# Patient Record
Sex: FEMALE | Race: OTHER
[De-identification: names, ages, dates, MRNs, and addresses within clinical notes are randomized per-mention and may not be internally consistent; named-entity substitution may affect disease eponyms.]

---

## 2017-07-21 PROBLEM — Z00.00 ENCOUNTER FOR PREVENTIVE HEALTH EXAMINATION: Status: ACTIVE | Noted: 2017-07-21

## 2017-08-18 ENCOUNTER — APPOINTMENT (OUTPATIENT)
Dept: OTOLARYNGOLOGY | Facility: CLINIC | Age: 22
End: 2017-08-18

## 2019-09-20 ENCOUNTER — APPOINTMENT (OUTPATIENT)
Dept: OTOLARYNGOLOGY | Facility: CLINIC | Age: 24
End: 2019-09-20
Payer: COMMERCIAL

## 2019-09-20 VITALS
BODY MASS INDEX: 24.59 KG/M2 | HEIGHT: 64 IN | DIASTOLIC BLOOD PRESSURE: 67 MMHG | HEART RATE: 64 BPM | SYSTOLIC BLOOD PRESSURE: 102 MMHG | WEIGHT: 144 LBS

## 2019-09-20 DIAGNOSIS — R49.0 DYSPHONIA: ICD-10-CM

## 2019-09-20 DIAGNOSIS — Z86.19 PERSONAL HISTORY OF OTHER INFECTIOUS AND PARASITIC DISEASES: ICD-10-CM

## 2019-09-20 DIAGNOSIS — K21.9 GASTRO-ESOPHAGEAL REFLUX DISEASE W/OUT ESOPHAGITIS: ICD-10-CM

## 2019-09-20 DIAGNOSIS — Z83.3 FAMILY HISTORY OF DIABETES MELLITUS: ICD-10-CM

## 2019-09-20 DIAGNOSIS — Z80.3 FAMILY HISTORY OF MALIGNANT NEOPLASM OF BREAST: ICD-10-CM

## 2019-09-20 DIAGNOSIS — Z72.89 OTHER PROBLEMS RELATED TO LIFESTYLE: ICD-10-CM

## 2019-09-20 DIAGNOSIS — J38.2 NODULES OF VOCAL CORDS: ICD-10-CM

## 2019-09-20 PROCEDURE — XXXXX: CPT

## 2019-09-20 RX ORDER — VALACYCLOVIR HYDROCHLORIDE 1 G/1
TABLET, FILM COATED ORAL
Refills: 0 | Status: ACTIVE | COMMUNITY

## 2019-11-07 ENCOUNTER — APPOINTMENT (OUTPATIENT)
Dept: OTOLARYNGOLOGY | Facility: CLINIC | Age: 24
End: 2019-11-07
Payer: COMMERCIAL

## 2019-11-07 PROCEDURE — 92524 BEHAVRAL QUALIT ANALYS VOICE: CPT | Mod: GN

## 2019-11-07 PROCEDURE — 31579 LARYNGOSCOPY TELESCOPIC: CPT

## 2019-12-20 ENCOUNTER — APPOINTMENT (OUTPATIENT)
Dept: OTOLARYNGOLOGY | Facility: CLINIC | Age: 24
End: 2019-12-20

## 2020-04-15 PROBLEM — Z80.3 FAMILY HISTORY OF BREAST CANCER: Status: ACTIVE | Noted: 2020-04-15

## 2020-04-15 PROBLEM — Z83.3 FAMILY HISTORY OF DIABETES MELLITUS: Status: ACTIVE | Noted: 2020-04-15

## 2020-04-15 PROBLEM — K21.9 GASTROESOPHAGEAL REFLUX DISEASE WITHOUT ESOPHAGITIS: Status: ACTIVE | Noted: 2020-04-15

## 2020-04-15 PROBLEM — J38.2 VOCAL CORD NODULES: Status: ACTIVE | Noted: 2019-09-20

## 2020-04-15 NOTE — ASSESSMENT
[FreeTextEntry1] :  Ms. Tyler has the following issues evaluated today:\par \par 1.) vocal cord nodules bilateral \par 2.) chronic hoarseness\par 3.) Likely element of reflux also\par \par PLAN:\par -- Voice therapy eval and treatment\par -- Hydration\par -- Reflux precautions reviewed\par \par Return PRN\par

## 2020-04-15 NOTE — PHYSICAL EXAM
[Midline] : trachea located in midline position [Laryngoscopy Performed] : laryngoscopy was performed, see procedure section for findings [Normal] : no rashes [FreeTextEntry1] : Low raspy voice with good volume [de-identified] : 1+ bilateral  [de-identified] : Carotid pulses 2+ bilateral.

## 2020-04-15 NOTE — CONSULT LETTER
[Dear  ___] : Dear  [unfilled], [( Thank you for referring [unfilled] for consultation for _____ )] : Thank you for referring [unfilled] for consultation for [unfilled] [Please see my note below.] : Please see my note below. [Consult Closing:] : Thank you very much for allowing me to participate in the care of this patient.  If you have any questions, please do not hesitate to contact me. [Sincerely,] : Sincerely, [FreeTextEntry2] : Kate Bonilla MD\par [address]\[place] [address]\par \par  [FreeTextEntry3] : \par Ashley Napier MD \par Otolaryngology, Head and Neck Surgery \par \par

## 2020-04-15 NOTE — PROCEDURE
[Image(s) Captured] : image(s) captured and filed [Video Captured] : video captured and filed [de-identified] : \par Indication:  Hoarseness.\par -Verbal consent was obtained from patient prior to procedure.\par -Shakeel-Synephrine and lidocaine 2% spray applied to the nasal cavities.\par Flexible laryngoscopy was performed via nostril and revealed the following:\par   -- Nasopharynx had no mass or exudate.\par   -- Base of tongue was symmetric and not enlarged.\par   -- Vallecula was clear\par   -- Epiglottis, both aryepiglottic folds and both false vocal folds were normal\par   -- Arytenoids both without edema and erythema \par   -- True vocal folds were fully mobile and with bilateral tiny nodules at junction anterior 1/3-posterior 2/3; no erythema\par   -- Post cricoid area was clear.\par   -- Interarytenoid edema was present.\par \par The patient tolerated the procedure well.\par

## 2020-04-15 NOTE — HISTORY OF PRESENT ILLNESS
[de-identified] : I was pleased to evaluate Ms. Tyler, a 24-year-old woman who was referred by Dr. Bonilla for vocal cord nodules.\par \par Voice problems started about 2 years ago.  Hoarseness intermittently, especially after a night out or dance performance when had to raise her voice --> would lose voice for a few days but back to normal.\par Now voice is consistently lower and raspy for at least 1 year.\par Had eval for PCOS and hormonal change that was negative.\par Saw Dr. Méndez for cough about 2 years ago and was diagnosed with at least one TVF nodule.  Saw voice therapist in Aug 2017 and suggested to have exercise, which she did not consistently do.\par Works as .  No vocal demands now on daily basis but will lose voice if yells a lot.\par No difficulty breathing, difficulty swallowing, throat pain, postnasal drip, heartburn.\par

## 2020-08-03 ENCOUNTER — TRANSCRIPTION ENCOUNTER (OUTPATIENT)
Age: 25
End: 2020-08-03

## 2020-12-04 ENCOUNTER — TRANSCRIPTION ENCOUNTER (OUTPATIENT)
Age: 25
End: 2020-12-04

## 2021-01-20 ENCOUNTER — TRANSCRIPTION ENCOUNTER (OUTPATIENT)
Age: 26
End: 2021-01-20

## 2021-03-03 ENCOUNTER — TRANSCRIPTION ENCOUNTER (OUTPATIENT)
Age: 26
End: 2021-03-03

## 2021-03-29 ENCOUNTER — TRANSCRIPTION ENCOUNTER (OUTPATIENT)
Age: 26
End: 2021-03-29

## 2021-09-30 ENCOUNTER — TRANSCRIPTION ENCOUNTER (OUTPATIENT)
Age: 26
End: 2021-09-30

## 2021-10-25 ENCOUNTER — TRANSCRIPTION ENCOUNTER (OUTPATIENT)
Age: 26
End: 2021-10-25

## 2022-03-07 ENCOUNTER — TRANSCRIPTION ENCOUNTER (OUTPATIENT)
Age: 27
End: 2022-03-07